# Patient Record
Sex: MALE | Race: WHITE | Employment: OTHER | URBAN - METROPOLITAN AREA
[De-identification: names, ages, dates, MRNs, and addresses within clinical notes are randomized per-mention and may not be internally consistent; named-entity substitution may affect disease eponyms.]

---

## 2019-06-14 ENCOUNTER — NEW PATIENT COMPREHENSIVE (OUTPATIENT)
Dept: URBAN - METROPOLITAN AREA CLINIC 38 | Facility: CLINIC | Age: 44
End: 2019-06-14

## 2019-06-14 DIAGNOSIS — H52.02: ICD-10-CM

## 2019-06-14 DIAGNOSIS — H52.4: ICD-10-CM

## 2019-06-14 DIAGNOSIS — Z01.00: ICD-10-CM

## 2019-06-14 PROCEDURE — 92015 DETERMINE REFRACTIVE STATE: CPT

## 2019-06-14 PROCEDURE — 92004 COMPRE OPH EXAM NEW PT 1/>: CPT

## 2019-06-14 ASSESSMENT — VISUAL ACUITY
OD_SC: J3
OD_CC: J3
OS_CC: J6
OS_SC: 20/20
OD_SC: 20/20
OS_SC: J8

## 2019-06-14 ASSESSMENT — TONOMETRY
OS_IOP_MMHG: 16
OD_IOP_MMHG: 17

## 2022-08-24 NOTE — PATIENT DISCUSSION
Intermittent ET with current Rx and close near work, increase + in Rx helped, may need executive BF at some point.